# Patient Record
Sex: FEMALE | Race: WHITE | NOT HISPANIC OR LATINO | Employment: OTHER | URBAN - METROPOLITAN AREA
[De-identification: names, ages, dates, MRNs, and addresses within clinical notes are randomized per-mention and may not be internally consistent; named-entity substitution may affect disease eponyms.]

---

## 2017-11-29 ENCOUNTER — APPOINTMENT (OUTPATIENT)
Dept: WOMENS IMAGING | Facility: HOSPITAL | Age: 72
End: 2017-11-29

## 2017-11-29 PROCEDURE — 77063 BREAST TOMOSYNTHESIS BI: CPT | Performed by: RADIOLOGY

## 2017-11-29 PROCEDURE — MDREVIEWSP: Performed by: RADIOLOGY

## 2017-11-29 PROCEDURE — G0202 SCR MAMMO BI INCL CAD: HCPCS | Performed by: RADIOLOGY

## 2018-11-12 ENCOUNTER — PREP FOR SURGERY (OUTPATIENT)
Dept: OTHER | Facility: HOSPITAL | Age: 73
End: 2018-11-12

## 2018-11-12 DIAGNOSIS — Z83.71 FH: COLON POLYPS: ICD-10-CM

## 2018-11-12 DIAGNOSIS — K21.9 GASTROESOPHAGEAL REFLUX DISEASE, ESOPHAGITIS PRESENCE NOT SPECIFIED: ICD-10-CM

## 2018-11-12 DIAGNOSIS — Z80.0 FH: COLON CANCER: ICD-10-CM

## 2018-11-12 DIAGNOSIS — R13.10 DYSPHAGIA, UNSPECIFIED TYPE: ICD-10-CM

## 2018-11-12 DIAGNOSIS — Z86.010 HX OF COLONIC POLYPS: Primary | ICD-10-CM

## 2018-11-26 PROBLEM — Z80.0 FH: COLON CANCER: Status: ACTIVE | Noted: 2018-11-26

## 2018-11-26 PROBLEM — Z83.719 FH: COLON POLYPS: Status: ACTIVE | Noted: 2018-11-26

## 2018-11-26 PROBLEM — K21.9 GASTROESOPHAGEAL REFLUX DISEASE: Status: ACTIVE | Noted: 2018-11-26

## 2018-11-26 PROBLEM — Z83.71 FH: COLON POLYPS: Status: ACTIVE | Noted: 2018-11-26

## 2018-11-26 PROBLEM — Z86.0100 HX OF COLONIC POLYPS: Status: ACTIVE | Noted: 2018-11-26

## 2018-11-26 PROBLEM — Z86.010 HX OF COLONIC POLYPS: Status: ACTIVE | Noted: 2018-11-26

## 2018-11-26 PROBLEM — R13.10 DYSPHAGIA: Status: ACTIVE | Noted: 2018-11-26

## 2018-11-30 ENCOUNTER — APPOINTMENT (OUTPATIENT)
Dept: WOMENS IMAGING | Facility: HOSPITAL | Age: 73
End: 2018-11-30

## 2018-11-30 PROCEDURE — 77063 BREAST TOMOSYNTHESIS BI: CPT | Performed by: RADIOLOGY

## 2018-11-30 PROCEDURE — 77067 SCR MAMMO BI INCL CAD: CPT | Performed by: RADIOLOGY

## 2018-11-30 PROCEDURE — MDREVIEWSP: Performed by: RADIOLOGY

## 2018-12-18 ENCOUNTER — ANESTHESIA EVENT (OUTPATIENT)
Dept: GASTROENTEROLOGY | Facility: HOSPITAL | Age: 73
End: 2018-12-18

## 2018-12-18 ENCOUNTER — HOSPITAL ENCOUNTER (OUTPATIENT)
Facility: HOSPITAL | Age: 73
Setting detail: HOSPITAL OUTPATIENT SURGERY
Discharge: HOME | End: 2018-12-18
Attending: INTERNAL MEDICINE | Admitting: INTERNAL MEDICINE

## 2018-12-18 ENCOUNTER — ANESTHESIA (OUTPATIENT)
Dept: GASTROENTEROLOGY | Facility: HOSPITAL | Age: 73
End: 2018-12-18

## 2018-12-18 VITALS
HEART RATE: 55 BPM | HEIGHT: 63 IN | RESPIRATION RATE: 17 BRPM | WEIGHT: 177.56 LBS | OXYGEN SATURATION: 100 % | TEMPERATURE: 98.4 F | BODY MASS INDEX: 31.46 KG/M2 | DIASTOLIC BLOOD PRESSURE: 66 MMHG | SYSTOLIC BLOOD PRESSURE: 114 MMHG

## 2018-12-18 DIAGNOSIS — Z86.010 HX OF COLONIC POLYPS: ICD-10-CM

## 2018-12-18 DIAGNOSIS — R13.10 DYSPHAGIA, UNSPECIFIED TYPE: ICD-10-CM

## 2018-12-18 DIAGNOSIS — Z83.71 FH: COLON POLYPS: ICD-10-CM

## 2018-12-18 DIAGNOSIS — Z80.0 FH: COLON CANCER: ICD-10-CM

## 2018-12-18 DIAGNOSIS — K21.9 GASTROESOPHAGEAL REFLUX DISEASE, ESOPHAGITIS PRESENCE NOT SPECIFIED: ICD-10-CM

## 2018-12-18 PROCEDURE — 43249 ESOPH EGD DILATION <30 MM: CPT | Performed by: INTERNAL MEDICINE

## 2018-12-18 PROCEDURE — 45385 COLONOSCOPY W/LESION REMOVAL: CPT | Performed by: INTERNAL MEDICINE

## 2018-12-18 PROCEDURE — 87081 CULTURE SCREEN ONLY: CPT | Performed by: INTERNAL MEDICINE

## 2018-12-18 PROCEDURE — 25010000002 PROPOFOL 10 MG/ML EMULSION: Performed by: ANESTHESIOLOGY

## 2018-12-18 PROCEDURE — 43239 EGD BIOPSY SINGLE/MULTIPLE: CPT | Performed by: INTERNAL MEDICINE

## 2018-12-18 PROCEDURE — C1726 CATH, BAL DIL, NON-VASCULAR: HCPCS | Performed by: INTERNAL MEDICINE

## 2018-12-18 PROCEDURE — S0260 H&P FOR SURGERY: HCPCS | Performed by: INTERNAL MEDICINE

## 2018-12-18 PROCEDURE — 88305 TISSUE EXAM BY PATHOLOGIST: CPT | Performed by: INTERNAL MEDICINE

## 2018-12-18 PROCEDURE — 88342 IMHCHEM/IMCYTCHM 1ST ANTB: CPT | Performed by: INTERNAL MEDICINE

## 2018-12-18 RX ORDER — PRAVASTATIN SODIUM 40 MG
40 TABLET ORAL DAILY
COMMUNITY

## 2018-12-18 RX ORDER — LISINOPRIL AND HYDROCHLOROTHIAZIDE 25; 20 MG/1; MG/1
1 TABLET ORAL DAILY
COMMUNITY

## 2018-12-18 RX ORDER — FLUTICASONE PROPIONATE 50 MCG
2 SPRAY, SUSPENSION (ML) NASAL DAILY
COMMUNITY

## 2018-12-18 RX ORDER — SODIUM CHLORIDE, SODIUM LACTATE, POTASSIUM CHLORIDE, CALCIUM CHLORIDE 600; 310; 30; 20 MG/100ML; MG/100ML; MG/100ML; MG/100ML
1000 INJECTION, SOLUTION INTRAVENOUS CONTINUOUS
Status: DISCONTINUED | OUTPATIENT
Start: 2018-12-18 | End: 2018-12-18 | Stop reason: HOSPADM

## 2018-12-18 RX ORDER — ASPIRIN 81 MG/1
81 TABLET ORAL DAILY
COMMUNITY

## 2018-12-18 RX ORDER — ATENOLOL 50 MG/1
50 TABLET ORAL DAILY
COMMUNITY

## 2018-12-18 RX ORDER — PROPOFOL 10 MG/ML
VIAL (ML) INTRAVENOUS CONTINUOUS PRN
Status: DISCONTINUED | OUTPATIENT
Start: 2018-12-18 | End: 2018-12-18 | Stop reason: SURG

## 2018-12-18 RX ORDER — FEXOFENADINE HCL 180 MG/1
180 TABLET ORAL DAILY
COMMUNITY

## 2018-12-18 RX ORDER — AMLODIPINE BESYLATE 5 MG/1
5 TABLET ORAL DAILY
COMMUNITY

## 2018-12-18 RX ORDER — LIDOCAINE HYDROCHLORIDE 20 MG/ML
INJECTION, SOLUTION INFILTRATION; PERINEURAL AS NEEDED
Status: DISCONTINUED | OUTPATIENT
Start: 2018-12-18 | End: 2018-12-18 | Stop reason: SURG

## 2018-12-18 RX ORDER — PANTOPRAZOLE SODIUM 40 MG/1
40 TABLET, DELAYED RELEASE ORAL DAILY
COMMUNITY

## 2018-12-18 RX ADMIN — ALFENTANIL HYDROCHLORIDE 500 MCG: 500 INJECTION, SOLUTION INTRAVENOUS at 14:35

## 2018-12-18 RX ADMIN — PROPOFOL 160 MCG/KG/MIN: 10 INJECTION, EMULSION INTRAVENOUS at 14:35

## 2018-12-18 RX ADMIN — SODIUM CHLORIDE, POTASSIUM CHLORIDE, SODIUM LACTATE AND CALCIUM CHLORIDE 1000 ML: 600; 310; 30; 20 INJECTION, SOLUTION INTRAVENOUS at 13:41

## 2018-12-18 RX ADMIN — LIDOCAINE HYDROCHLORIDE 40 MG: 20 INJECTION, SOLUTION INFILTRATION; PERINEURAL at 14:35

## 2018-12-18 NOTE — DISCHARGE INSTRUCTIONS
For the next 24 hours patient needs to be with a responsible adult.    For 24 hours DO NOT drive, operate machinery, appliances, drink alcohol, make important decisions or sign legal documents.    Start with a light or bland diet and advance to regular diet as tolerated.    Follow recommendations on procedure report provided by your doctor.    Call Dr Archuleta for problems [phone]    Problems may include but not limited to: large amounts of bleeding, trouble breathing, repeated vomiting, severe unrelieved pain, fever or chills.

## 2018-12-18 NOTE — ANESTHESIA POSTPROCEDURE EVALUATION
"Patient: Alla Corrigan    Procedure Summary     Date:  12/18/18 Room / Location:   [facility]    Anesthesia Start:  1430 Anesthesia Stop:  1510    Procedures:       COLONOSCOPY INTO CECUM & TERMINAL ILEUM WITH COLD SNARE POLYPECTOMY AND COLD BIOPSY POLYPECTOMY (N/A )      ESOPHAGOGASTRODUODENOSCOPY WITH COLD BIOPSIES & BALLOON DILITATION SIZE 12, 13.5, 15 (N/A Esophagus) Diagnosis:       Esophagitis      Esophageal stricture      Hiatal hernia      Gastritis      Colon polyps      Hemorrhoids      (Hx of colonic polyps [Z86.010])      (FH: colon polyps [Z83.71])      (FH: colon cancer [Z80.0])      (Dysphagia, unspecified type [R13.10])      (Gastroesophageal reflux disease, esophagitis presence not specified [K21.9])    Surgeon:  Olman Archuleta MD Provider:  Jordan Parsons MD    Anesthesia Type:  MAC ASA Status:  2          Anesthesia Type: MAC  Last vitals  BP   94/59 (12/18/18 1510)   Temp   36.9 °C (98.4 °F) (12/18/18 1324)   Pulse   56 (12/18/18 1510)   Resp   15 (12/18/18 1510)     SpO2   100 % (12/18/18 1510)     Post Anesthesia Care and Evaluation    Patient location during evaluation: bedside  Patient participation: complete - patient participated  Level of consciousness: awake  Pain management: adequate  Airway patency: patent  Anesthetic complications: No anesthetic complications    Cardiovascular status: acceptable  Respiratory status: acceptable  Hydration status: acceptable    Comments: BP 94/59 (BP Location: Left arm, Patient Position: Lying)   Pulse 56   Temp 36.9 °C (98.4 °F) (Oral)   Resp 15   Ht 160 cm (63\")   Wt 80.5 kg (177 lb 9 oz)   SpO2 100%   BMI 31.45 kg/m²         "

## 2018-12-18 NOTE — H&P
[facility]  Pre Procedure History & Physical    Chief Complaint:   GERD, history of polyps, family history    Subjective     HPI:   This 73-year-old female presents to the endoscopy suite for upper and lower endoscopic evaluation.  She has history of reflux as well as a personal history of polyps and a family history of both polyps and colorectal cancer.  Her last colonoscopy performed November 2013, last upper endoscopy September 2010.    Past Medical History:   Past Medical History:   Diagnosis Date   • Cancer (CMS/HCC)    • GERD (gastroesophageal reflux disease)    • Hyperlipidemia    • Hypertension        Past Surgical History:  Past Surgical History:   Procedure Laterality Date   • COLONOSCOPY     • ENDOSCOPY     • ESOPHAGUS SURGERY     • HYSTERECTOMY     • NISSEN FUNDOPLICATION     • TONSILLECTOMY         Family History:  History reviewed. No pertinent family history.    Social History:   reports that  has never smoked. She does not have any smokeless tobacco history on file. She reports that she drinks alcohol. She reports that she does not use drugs.    Medications:   Medications Prior to Admission   Medication Sig Dispense Refill Last Dose   • amLODIPine (NORVASC) 5 MG tablet Take 5 mg by mouth Daily.   12/18/2018 at Unknown time   • aspirin 81 MG EC tablet Take 81 mg by mouth Daily.   12/13/2018   • atenolol (TENORMIN) 50 MG tablet Take 50 mg by mouth Daily.   12/18/2018 at Unknown time   • Calcium Carbonate-Vitamin D (CALCIUM 600+D PO) Take  by mouth.   Past Week at Unknown time   • fexofenadine (ALLEGRA) 180 MG tablet Take 180 mg by mouth Daily.   12/18/2018 at Unknown time   • fluticasone (FLONASE) 50 MCG/ACT nasal spray 2 sprays into the nostril(s) as directed by provider Daily.   12/17/2018 at Unknown time   • lisinopril-hydrochlorothiazide (PRINZIDE,ZESTORETIC) 20-25 MG per tablet Take 1 tablet by mouth Daily.   12/18/2018 at Unknown time   • pantoprazole (PROTONIX) 40 MG EC  "tablet Take 40 mg by mouth Daily.   12/17/2018 at Unknown time   • pravastatin (PRAVACHOL) 40 MG tablet Take 40 mg by mouth Daily.   12/17/2018 at Unknown time       Allergies:  Sulfa antibiotics    ROS:    Pertinent items are noted in HPI, all other systems reviewed and negative     Objective     Blood pressure 131/74, pulse 62, temperature 98.4 °F (36.9 °C), temperature source Oral, resp. rate 12, height 160 cm (63\"), weight 80.5 kg (177 lb 9 oz), SpO2 100 %.    Physical Exam   Constitutional: Pt is oriented to person, place, and time and well-developed, well-nourished, and in no distress.   Mouth/Throat: Oropharynx is clear and moist.   Neck: Normal range of motion.   Cardiovascular: Normal rate, regular rhythm and normal heart sounds.    Pulmonary/Chest: Effort normal and breath sounds normal.   Abdominal: Soft. Nontender  Skin: Skin is warm and dry.   Psychiatric: Mood, memory, affect and judgment normal.     Assessment/Plan     Diagnosis:  GERD  Polyps  Family history    Anticipated Surgical Procedure:  EGD, colonoscopy    The risks, benefits, and alternatives of this procedure have been discussed with the patient or the responsible party- the patient understands and agrees to proceed.                                                          "

## 2018-12-18 NOTE — ANESTHESIA PREPROCEDURE EVALUATION
Anesthesia Evaluation     Patient summary reviewed and Nursing notes reviewed   no history of anesthetic complications:  NPO Solid Status: > 6 hours  NPO Liquid Status: > 6 hours           Airway   Mallampati: II  TM distance: >3 FB  Neck ROM: full  no difficulty expected and No difficulty expected  Dental - normal exam     Pulmonary - negative pulmonary ROS and normal exam    breath sounds clear to auscultation  (-) rhonchi, decreased breath sounds, wheezes, rales, stridor  Cardiovascular - negative cardio ROS and normal exam    NYHA Classification: I  Rhythm: regular  Rate: normal    (-) murmur, weak pulses, friction rub, systolic click, carotid bruits, JVD, peripheral edema      Neuro/Psych- negative ROS  GI/Hepatic/Renal/Endo    (+)  GERD,      Musculoskeletal (-) negative ROS    Abdominal  - normal exam    Abdomen: soft.   Substance History - negative use     OB/GYN negative ob/gyn ROS         Other - negative ROS                     Anesthesia Plan    ASA 2     MAC     intravenous induction   Anesthetic plan, all risks, benefits, and alternatives have been provided, discussed and informed consent has been obtained with: patient.

## 2018-12-19 LAB — UREASE TISS QL: NEGATIVE

## 2018-12-24 ENCOUNTER — TELEPHONE (OUTPATIENT)
Dept: GASTROENTEROLOGY | Facility: CLINIC | Age: 73
End: 2018-12-24

## 2018-12-24 NOTE — TELEPHONE ENCOUNTER
----- Message from Olman MONSON MD sent at 12/21/2018  3:45 PM EST -----  Regarding: Biopsy results  Okay to call results, recommend continue PPI.  Follow-up colonoscopy in 5 years.  Office follow-up annually or sooner as needed.  ----- Message -----  From: Lab, Background User  Sent: 12/19/2018   7:48 PM  To: Olman MONSON MD

## 2018-12-24 NOTE — TELEPHONE ENCOUNTER
Called pt and advised per Dr Archuleta that the duodenum bx showed mild chronic inflammation.  The stomach bx showed mild to mod chronic inflammation and was neg for h pylori .  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  The colon polyps were benign.  He recommends to continue ppi, repeat egd in 2 yrs, repeat c/s in 5 yrs and f/u yearly. Pt verb understanding.     Egd placed in recall for 12/18/2020, c/s placed in recall for 2023 and f/u in recall for 12/18/2019.

## 2018-12-24 NOTE — TELEPHONE ENCOUNTER
----- Message from Olman MONSON MD sent at 12/20/2018  5:23 PM EST -----  Regarding: Biopsy results   Okay to call results, recommend follow-up EGD in 2 years.  Continue PPI.  Office follow-up annually.  Colonoscopy follow-up in 5 years.  ----- Message -----  From: Lab, Background User  Sent: 12/19/2018   7:48 PM  To: Olman MONSON MD

## 2018-12-24 NOTE — TELEPHONE ENCOUNTER
----- Message from Leigh Ann Sánchez sent at 12/24/2018  9:23 AM EST -----  Regarding: scope report   Contact: [phone]  Calling for c/s path report

## 2018-12-26 LAB
CYTO UR: NORMAL
LAB AP CASE REPORT: NORMAL
LAB AP DIAGNOSIS COMMENT: NORMAL
PATH REPORT.ADDENDUM SPEC: NORMAL
PATH REPORT.FINAL DX SPEC: NORMAL
PATH REPORT.GROSS SPEC: NORMAL

## 2019-12-02 ENCOUNTER — APPOINTMENT (OUTPATIENT)
Dept: WOMENS IMAGING | Facility: HOSPITAL | Age: 74
End: 2019-12-02

## 2019-12-02 PROCEDURE — 77063 BREAST TOMOSYNTHESIS BI: CPT | Performed by: RADIOLOGY

## 2019-12-02 PROCEDURE — 77067 SCR MAMMO BI INCL CAD: CPT | Performed by: RADIOLOGY

## 2019-12-02 PROCEDURE — MDREVIEWSP: Performed by: RADIOLOGY

## 2020-10-03 ENCOUNTER — FLU SHOT (OUTPATIENT)
Dept: FAMILY MEDICINE CLINIC | Facility: CLINIC | Age: 75
End: 2020-10-03

## 2020-10-03 DIAGNOSIS — Z23 NEED FOR INFLUENZA VACCINATION: ICD-10-CM

## 2020-10-03 PROCEDURE — 90686 IIV4 VACC NO PRSV 0.5 ML IM: CPT | Performed by: NURSE PRACTITIONER

## 2020-10-03 PROCEDURE — 90471 IMMUNIZATION ADMIN: CPT | Performed by: NURSE PRACTITIONER

## 2020-12-15 ENCOUNTER — APPOINTMENT (OUTPATIENT)
Dept: WOMENS IMAGING | Facility: HOSPITAL | Age: 75
End: 2020-12-15

## 2020-12-15 PROCEDURE — 77063 BREAST TOMOSYNTHESIS BI: CPT | Performed by: RADIOLOGY

## 2020-12-15 PROCEDURE — 77067 SCR MAMMO BI INCL CAD: CPT | Performed by: RADIOLOGY

## 2021-03-09 DIAGNOSIS — Z23 IMMUNIZATION DUE: ICD-10-CM

## 2021-12-16 ENCOUNTER — APPOINTMENT (OUTPATIENT)
Dept: WOMENS IMAGING | Facility: HOSPITAL | Age: 76
End: 2021-12-16

## 2021-12-16 PROCEDURE — 77063 BREAST TOMOSYNTHESIS BI: CPT | Performed by: RADIOLOGY

## 2021-12-16 PROCEDURE — 77067 SCR MAMMO BI INCL CAD: CPT | Performed by: RADIOLOGY

## 2022-12-19 ENCOUNTER — APPOINTMENT (OUTPATIENT)
Dept: WOMENS IMAGING | Facility: HOSPITAL | Age: 77
End: 2022-12-19
Payer: MEDICARE

## 2022-12-19 PROCEDURE — 77067 SCR MAMMO BI INCL CAD: CPT | Performed by: RADIOLOGY

## 2022-12-19 PROCEDURE — 77063 BREAST TOMOSYNTHESIS BI: CPT | Performed by: RADIOLOGY

## 2023-07-27 ENCOUNTER — TELEPHONE (OUTPATIENT)
Dept: GASTROENTEROLOGY | Facility: CLINIC | Age: 78
End: 2023-07-27
Payer: MEDICARE

## 2023-07-27 NOTE — TELEPHONE ENCOUNTER
Caller: Alla Corrigan    Relationship to patient: Self    Best call back number: [phone]    Chief complaint: PT WANTS TO SCHEDULE EGD & COLONOSCOPY     Type of visit: COLONOSCOPY & EGD     Requested date: FIRST AVAILABLE     PT IS NEEDING PAPERWORK TO BE SENT

## 2023-08-09 DIAGNOSIS — Z83.71 FAMILY HISTORY OF POLYPS IN THE COLON: ICD-10-CM

## 2023-08-09 DIAGNOSIS — K63.5 POLYP OF COLON, UNSPECIFIED PART OF COLON, UNSPECIFIED TYPE: Primary | ICD-10-CM

## 2023-08-09 DIAGNOSIS — Z80.0 FAMILY HISTORY OF GI MALIGNANCY: ICD-10-CM

## 2023-08-11 ENCOUNTER — TELEPHONE (OUTPATIENT)
Dept: GASTROENTEROLOGY | Facility: CLINIC | Age: 78
End: 2023-08-11
Payer: MEDICARE

## 2023-08-14 ENCOUNTER — TELEPHONE (OUTPATIENT)
Dept: GASTROENTEROLOGY | Facility: CLINIC | Age: 78
End: 2023-08-14
Payer: MEDICARE

## 2023-08-14 ENCOUNTER — TELEPHONE (OUTPATIENT)
Dept: GASTROENTEROLOGY | Facility: CLINIC | Age: 78
End: 2023-08-14

## 2023-08-14 NOTE — TELEPHONE ENCOUNTER
Caller: Alla Corrigan    Relationship to patient: Self    Best call back number: [phone]\    Patient is needing: PATIENT CALLED IN AND IS RETURNING A CALL FROM [facility] TO SCHEDULE HER SCOPE. SHE IS REQUESTING A CALL BACK AND YOU CAN LEAVE A VOICEMAIL.            Scheduled follow up 3/30 @ 3:30p

## 2023-08-14 NOTE — TELEPHONE ENCOUNTER
Caller: Alla Corrigan    Relationship to patient: Self    Best call back number: [phone]\    Patient is needing: PATIENT CALLED IN AND IS RETURNING A CALL FROM [facility] TO SCHEDULE HER SCOPE. SHE IS REQUESTING A CALL BACK AND YOU CAN LEAVE A VOICEMAIL.

## 2023-08-15 NOTE — TELEPHONE ENCOUNTER
"12/24/18 result note reviewed.    \"Okay to call results, recommend follow-up EGD in 2 years. Continue PPI. Office follow-up annually. Colonoscopy follow-up in 5 years.\" Hx of Barretts    Message to dr Archuleta to advise   "

## 2023-08-18 ENCOUNTER — PREP FOR SURGERY (OUTPATIENT)
Dept: OTHER | Facility: HOSPITAL | Age: 78
End: 2023-08-18
Payer: MEDICARE

## 2023-08-18 ENCOUNTER — TELEPHONE (OUTPATIENT)
Dept: GASTROENTEROLOGY | Facility: CLINIC | Age: 78
End: 2023-08-18
Payer: MEDICARE

## 2023-08-18 PROBLEM — K63.5 POLYP OF COLON: Status: ACTIVE | Noted: 2023-08-18

## 2023-08-18 NOTE — TELEPHONE ENCOUNTER
Hub staff attempted to follow warm transfer process and was unsuccessful     Caller: Alla Corrigan    Relationship to patient: Self    Best call back number: [phone]     Patient is needing:   PATIENT IS CALLING TO INQUIRE ABOUT HER PROCEDURE THAT NEEDS TO BE SCHEDULED. IT WAS ORIGINALLY JUST A COLONOSCOPY BUT SHE WANTED TO HAVE AN EGD AS WELL.  PLEASE CALL BACK TO ADVISE ON STATUS.  IF YOU CANNOT REACH PATIENT ON HOME PHONE PLEASE CALL MOBILE #:  [phone]

## 2023-08-18 NOTE — TELEPHONE ENCOUNTER
I have modified the case request to add EGD to colonoscopy order.     Message to the scheduling department.

## 2023-08-18 NOTE — TELEPHONE ENCOUNTER
Advised that [facility] will call with final arrival time minimum 24 hrs before procedure. IF they do not get a phone call, arrival time will stay the same as given on instructions [facility] FOR - 11/06/2023 OK FOR HUB TO READ

## 2023-09-29 NOTE — TELEPHONE ENCOUNTER
Hub staff attempted to follow warm transfer process and was unsuccessful     Caller: Alla Corrigan    Relationship to patient: Self    Best call back number:   [phone] PREFERRED NUMBER  [phone] CELL (ALTERNATIVE NUMBER)    Patient is needing: PATIENT HAS SPOKEN WITH [employer] WHO ASSURED HER THE OUT OF NETWORK COST FOR PROCEDURE WOULD STILL BE THE SAME, BUT THAT SHE NEEDS PRIOR AUTHORIZATION FOR HER SCOPES ON 11/06/23. PLEASE REVIEW AND CONTACT PATIENT.

## 2023-10-13 ENCOUNTER — TELEPHONE (OUTPATIENT)
Dept: GASTROENTEROLOGY | Facility: CLINIC | Age: 78
End: 2023-10-13
Payer: MEDICARE

## 2023-10-13 NOTE — TELEPHONE ENCOUNTER
PT CALLED AND SHE NEEDS A PRIOR AUTHORIZATION FOR HER PROCEDURE FOR HER PROCEDURES SCHEDULED FOR 11/6.  SHE WOULD LIKE A CALL BACK.  [phone]

## 2023-11-01 ENCOUNTER — TELEPHONE (OUTPATIENT)
Dept: GASTROENTEROLOGY | Facility: CLINIC | Age: 78
End: 2023-11-01
Payer: MEDICARE

## 2023-11-06 ENCOUNTER — OUTSIDE FACILITY SERVICE (OUTPATIENT)
Dept: GASTROENTEROLOGY | Facility: CLINIC | Age: 78
End: 2023-11-06
Payer: MEDICARE

## 2023-12-20 ENCOUNTER — APPOINTMENT (OUTPATIENT)
Dept: WOMENS IMAGING | Facility: HOSPITAL | Age: 78
End: 2023-12-20
Payer: MEDICARE

## 2023-12-20 PROCEDURE — 77067 SCR MAMMO BI INCL CAD: CPT | Performed by: RADIOLOGY

## 2023-12-20 PROCEDURE — 77063 BREAST TOMOSYNTHESIS BI: CPT | Performed by: RADIOLOGY

## 2024-01-08 ENCOUNTER — OUTSIDE FACILITY SERVICE (OUTPATIENT)
Dept: GASTROENTEROLOGY | Facility: CLINIC | Age: 79
End: 2024-01-08
Payer: MEDICARE

## 2024-01-08 ENCOUNTER — LAB REQUISITION (OUTPATIENT)
Dept: LAB | Facility: HOSPITAL | Age: 79
End: 2024-01-08
Payer: MEDICARE

## 2024-01-08 DIAGNOSIS — Z12.11 ENCOUNTER FOR SCREENING COLONOSCOPY: ICD-10-CM

## 2024-01-08 PROCEDURE — 43239 EGD BIOPSY SINGLE/MULTIPLE: CPT | Performed by: INTERNAL MEDICINE

## 2024-01-08 PROCEDURE — G0105 COLORECTAL SCRN; HI RISK IND: HCPCS | Performed by: INTERNAL MEDICINE

## 2024-01-08 PROCEDURE — 88305 TISSUE EXAM BY PATHOLOGIST: CPT | Performed by: INTERNAL MEDICINE

## 2024-01-10 LAB
LAB AP CASE REPORT: NORMAL
LAB AP DIAGNOSIS COMMENT: NORMAL
PATH REPORT.FINAL DX SPEC: NORMAL
PATH REPORT.GROSS SPEC: NORMAL

## 2024-01-15 ENCOUNTER — TELEPHONE (OUTPATIENT)
Dept: GASTROENTEROLOGY | Facility: CLINIC | Age: 79
End: 2024-01-15
Payer: MEDICARE

## 2024-01-15 NOTE — TELEPHONE ENCOUNTER
----- Message from Olman MONSON MD sent at 1/14/2024  1:39 PM EST -----  Regarding: Bx results  OK to notify patient of biopsy results, would continue PPI and offer F/U EGD in 3 years as well as annual office F/U  ----- Message -----  From: Lab, Background User  Sent: 1/10/2024  12:03 PM EST  To: Olman MONSON MD

## 2024-01-15 NOTE — TELEPHONE ENCOUNTER
Dr Archuleta, can you please advise on the following ;  3. Esophagus, biopsy:               A. Squamous mucosa with an increase in intraepithelial eosinophils and eosinophilic microabscess       (up to 25/HPF); see comment #1.    1) The differential diagnosis for eosinophilia in the esophagus is severe reflux esophagitis versus eosinophilic esophagitis. The presence of an eosinophilic microabscess is suggestive of eosinophilic esophagitis. Clinical and endoscopic correlation is recommended.

## 2024-01-16 NOTE — TELEPHONE ENCOUNTER
Patient notified of results and recommendations and verbalized understanding    Fu scheduled for next week

## 2024-01-23 ENCOUNTER — OFFICE VISIT (OUTPATIENT)
Dept: GASTROENTEROLOGY | Facility: CLINIC | Age: 79
End: 2024-01-23
Payer: MEDICARE

## 2024-01-23 VITALS
TEMPERATURE: 96.3 F | HEIGHT: 63 IN | WEIGHT: 172 LBS | DIASTOLIC BLOOD PRESSURE: 75 MMHG | SYSTOLIC BLOOD PRESSURE: 114 MMHG | HEART RATE: 78 BPM | BODY MASS INDEX: 30.48 KG/M2 | OXYGEN SATURATION: 95 %

## 2024-01-23 DIAGNOSIS — R13.10 DYSPHAGIA, UNSPECIFIED TYPE: ICD-10-CM

## 2024-01-23 DIAGNOSIS — K21.9 GASTROESOPHAGEAL REFLUX DISEASE, UNSPECIFIED WHETHER ESOPHAGITIS PRESENT: Primary | ICD-10-CM

## 2024-01-23 DIAGNOSIS — K63.5 POLYP OF COLON, UNSPECIFIED PART OF COLON, UNSPECIFIED TYPE: ICD-10-CM

## 2024-01-23 PROCEDURE — 1159F MED LIST DOCD IN RCRD: CPT | Performed by: INTERNAL MEDICINE

## 2024-01-23 PROCEDURE — 99214 OFFICE O/P EST MOD 30 MIN: CPT | Performed by: INTERNAL MEDICINE

## 2024-01-23 PROCEDURE — 1160F RVW MEDS BY RX/DR IN RCRD: CPT | Performed by: INTERNAL MEDICINE

## 2024-01-23 RX ORDER — FLUTICASONE PROPIONATE AND SALMETEROL 250; 50 UG/1; UG/1
1 POWDER RESPIRATORY (INHALATION)
Qty: 1 EACH | Refills: 5 | Status: SHIPPED | OUTPATIENT
Start: 2024-01-23

## 2024-01-23 RX ORDER — PREDNISONE 20 MG/1
TABLET ORAL
COMMUNITY
Start: 2023-11-01

## 2024-01-23 RX ORDER — MELATONIN
1000 DAILY
COMMUNITY

## 2024-01-23 RX ORDER — TRIAMCINOLONE ACETONIDE 1 MG/G
CREAM TOPICAL
COMMUNITY
Start: 2023-12-04

## 2024-01-23 RX ORDER — TRIAMCINOLONE ACETONIDE 0.25 MG/G
CREAM TOPICAL
COMMUNITY

## 2024-01-23 NOTE — PROGRESS NOTES
Chief Complaint   Patient presents with   • Esophagitis        Alla Corrigan is a  78 y.o. female here for a follow up visit for esophagitis, GERD, dysphagia, history of polyps    HPI this 78-year-old white female patient of Dr. Jose Hidalgo presents in follow-up since undergoing upper endoscopic evaluation on January 8 of this year.  That study revealed up to 25 eosinophils per high-powered field as well as eosinophilic microabscess.  The differential included severe reflux esophagitis versus eosinophilic esophagitis.  She has been on a proton pump inhibitor but we talked about further treatment with the use of an inhaler i.e. Fluticasone for local effect and possible use of Dupixent if the inhaler is not effective.  She is amenable to this and will call with a progress report after trying the inhaler for 6 to 8 weeks.    Past Medical History:   Diagnosis Date   • Burton esophagus    • Cancer    • Colon polyp     benign   • GERD (gastroesophageal reflux disease)    • Hernia 1970    surgical repair   • Hyperlipidemia    • Hypertension    • Ulcer 7/1991    esophagus       Current Outpatient Medications   Medication Sig Dispense Refill   • amLODIPine (NORVASC) 5 MG tablet Take 1 tablet by mouth Daily.     • atenolol (TENORMIN) 50 MG tablet Take 1 tablet by mouth Daily.     • Calcium Carbonate-Vitamin D (CALCIUM 600+D PO) Take  by mouth.     • cholecalciferol ( Vitamin D3) 25 MCG (1000 UT) tablet Take 1 tablet by mouth Daily.     • fexofenadine (ALLEGRA) 180 MG tablet Take 1 tablet by mouth Daily.     • fluticasone (FLONASE) 50 MCG/ACT nasal spray 2 sprays into the nostril(s) as directed by provider Daily.     • lisinopril-hydrochlorothiazide (PRINZIDE,ZESTORETIC) 20-25 MG per tablet Take 1 tablet by mouth Daily.     • pantoprazole (PROTONIX) 40 MG EC tablet Take 1 tablet by mouth Daily.     • pravastatin (PRAVACHOL) 40 MG tablet Take 1 tablet by mouth Daily.     • predniSONE (DELTASONE) 20 MG tablet      •  triamcinolone (KENALOG) 0.025 % cream Apply  topically to the appropriate area as directed.     • triamcinolone (KENALOG) 0.1 % cream      • aspirin 81 MG EC tablet Take 81 mg by mouth Daily.       No current facility-administered medications for this visit.       PRN Meds:.    Allergies   Allergen Reactions   • Influenza Virus Vaccine Other (See Comments)     Patient states high dose increased cold symptoms   • Sulfa Antibiotics Hives   • Methylisothiazolinone Rash       Social History     Socioeconomic History   • Marital status:    Tobacco Use   • Smoking status: Never   Substance and Sexual Activity   • Alcohol use: Yes     Alcohol/week: 2.0 standard drinks of alcohol     Types: 2 Glasses of wine per week     Comment: social   • Drug use: No   • Sexual activity: Not Currently     Partners: Male     Birth control/protection: Post-menopausal, Natural family planning/Rhythm, Hysterectomy       Family History   Problem Relation Age of Onset   • Colon cancer Maternal Grandfather    • Colon cancer Maternal Great-Grandfather        Review of Systems   Gastrointestinal:         GERD  Dysphagia     Vitals:    01/23/24 1438   BP: 114/75   Pulse: 78   Temp: 96.3 °F (35.7 °C)   SpO2: 95%       Physical Exam  Constitutional:       Appearance: She is well-developed.   HENT:      Head: Normocephalic.   Eyes:      Conjunctiva/sclera: Conjunctivae normal.   Cardiovascular:      Rate and Rhythm: Normal rate and regular rhythm.   Pulmonary:      Breath sounds: Normal breath sounds.   Abdominal:      General: Bowel sounds are normal.      Palpations: Abdomen is soft.   Musculoskeletal:         General: Normal range of motion.      Cervical back: Normal range of motion.   Skin:     General: Skin is warm and dry.   Neurological:      Mental Status: She is alert and oriented to person, place, and time.   Psychiatric:         Behavior: Behavior normal.     ASSESSMENT AND PLAN      ICD-10-CM ICD-9-CM   1. Gastroesophageal reflux  disease, unspecified whether esophagitis present  K21.9 530.81   2. Dysphagia, unspecified type  R13.10 787.20   3. Polyp of colon, unspecified part of colon, unspecified type  K63.5 211.3

## 2024-02-29 PROBLEM — Z80.0 FAMILY HISTORY OF GI MALIGNANCY: Status: ACTIVE | Noted: 2023-08-09

## 2024-02-29 PROBLEM — Z83.719 FAMILY HISTORY OF POLYPS IN THE COLON: Status: ACTIVE | Noted: 2023-08-09

## 2024-03-26 ENCOUNTER — TELEPHONE (OUTPATIENT)
Dept: GASTROENTEROLOGY | Facility: CLINIC | Age: 79
End: 2024-03-26
Payer: MEDICARE

## 2024-03-26 NOTE — TELEPHONE ENCOUNTER
Hub staff attempted to follow warm transfer process and was unsuccessful     Caller: Alla Corrigan    Relationship to patient: Self    Best call back number: [phone]     Patient is needing: PT SAYS SHE FEELS THERE IS SLIGHT CHANGE, AND HER SYMPTOMS DO SEEM A LITTLE BIT BETTER. SHE SAYS ITS HARD TO TAKE IT FOUR TIMES A DAY, AND THERE HAS BEEN TIMES SHE HAS FORGOTTEN. SHE SAID IT WAS HARD TO TAKE IT RIGHT BEFORE BED BECAUSE SHE HAD AN ISSUE, AND STOPPED THAT RIGHT AWAY. SHE DOES NOT REMEMBER WHAT THE ISSUE IS THOUGH. SHE SAID IN THE LAST WEEK SHE HAS HAD SEVERE DRYNESS OF MOUTH, AND VERY LOW LEVELS OF SALIVA. SHE IS NOW TAKING IT 2X A DAY INSTEAD OF 4 BECAUSE OF THIS ISSUE.

## 2024-03-28 RX ORDER — FLUTICASONE PROPIONATE 220 UG/1
2 AEROSOL, METERED RESPIRATORY (INHALATION) 2 TIMES DAILY
Qty: 1 EACH | Refills: 5 | Status: SHIPPED | OUTPATIENT
Start: 2024-03-28

## 2024-03-28 NOTE — TELEPHONE ENCOUNTER
Called pt and passed on Dr Strong recommendations.  Advised to stop Advair and sent fluticasone to pharmacy.  Pt verbalized understanding

## 2024-12-23 ENCOUNTER — APPOINTMENT (OUTPATIENT)
Dept: WOMENS IMAGING | Facility: HOSPITAL | Age: 79
End: 2024-12-23
Payer: MEDICARE

## 2024-12-23 PROCEDURE — 77063 BREAST TOMOSYNTHESIS BI: CPT | Performed by: RADIOLOGY

## 2024-12-23 PROCEDURE — 77067 SCR MAMMO BI INCL CAD: CPT | Performed by: RADIOLOGY

## (undated) DEVICE — THE SINGLE USE ETRAP – POLYP TRAP IS USED FOR SUCTION RETRIEVAL OF ENDOSCOPICALLY REMOVED POLYPS.: Brand: ETRAP

## (undated) DEVICE — FRCP BX RADJAW4 NDL 2.8 240CM LG OG BX40

## (undated) DEVICE — CANN NASL CO2 TRULINK W/O2 A/

## (undated) DEVICE — SNAR POLYP SENSATION STDOVL 27 240 BX40

## (undated) DEVICE — DEV INFL CRE STERIFLATE 60CC DISP

## (undated) DEVICE — BITEBLOCK OMNI BLOC

## (undated) DEVICE — Device: Brand: DEFENDO AIR/WATER/SUCTION AND BIOPSY VALVE

## (undated) DEVICE — TUBING, SUCTION, 1/4" X 10', STRAIGHT: Brand: MEDLINE

## (undated) DEVICE — THE TORRENT IRRIGATION SCOPE CONNECTOR IS USED WITH THE TORRENT IRRIGATION TUBING TO PROVIDE IRRIGATION FLUIDS SUCH AS STERILE WATER DURING GASTROINTESTINAL ENDOSCOPIC PROCEDURES WHEN USED IN CONJUNCTION WITH AN IRRIGATION PUMP (OR ELECTROSURGICAL UNIT).: Brand: TORRENT

## (undated) DEVICE — ESOPHAGEAL BALLOON DILATATION CATHETER: Brand: CRE FIXED WIRE